# Patient Record
Sex: FEMALE | NOT HISPANIC OR LATINO | URBAN - METROPOLITAN AREA
[De-identification: names, ages, dates, MRNs, and addresses within clinical notes are randomized per-mention and may not be internally consistent; named-entity substitution may affect disease eponyms.]

---

## 2018-07-30 ENCOUNTER — IMPORTED ENCOUNTER (OUTPATIENT)
Dept: URBAN - METROPOLITAN AREA CLINIC 1 | Facility: CLINIC | Age: 43
End: 2018-07-30

## 2018-07-30 PROBLEM — H52.13: Noted: 2018-07-30

## 2018-07-30 PROCEDURE — S0620 ROUTINE OPHTHALMOLOGICAL EXA: HCPCS

## 2018-07-30 NOTE — PATIENT DISCUSSION
1. Myopia OU -- Finalized Glasses MRx was given to patient today for correction if indicated and requested. 2. Dry Eyes OU -- Recommend the frequent use of OTC AT's BID-QID OU. Trial CTL given to patient today. Return for an appointment in 1 WK for a CC OU with Dr. Felisa Santiago.

## 2018-08-07 ENCOUNTER — IMPORTED ENCOUNTER (OUTPATIENT)
Dept: URBAN - METROPOLITAN AREA CLINIC 1 | Facility: CLINIC | Age: 43
End: 2018-08-07

## 2019-06-03 ENCOUNTER — IMPORTED ENCOUNTER (OUTPATIENT)
Dept: URBAN - METROPOLITAN AREA CLINIC 1 | Facility: CLINIC | Age: 44
End: 2019-06-03

## 2019-06-03 PROBLEM — H52.13: Noted: 2019-06-03

## 2019-06-03 PROCEDURE — S0621 ROUTINE OPHTHALMOLOGICAL EXA: HCPCS

## 2019-06-03 NOTE — PATIENT DISCUSSION
1. Myopia OU -- Finalized Glasses MRx was given to patient today for correction. 2. Dry Eyes OU -- Recommend continue the frequent use of OTC AT's BID-QID OU. Finalized CTL Rx today. Return for an appointment in 1 year for a 40/cc with Dr. Prachi Flores.

## 2020-10-05 ENCOUNTER — IMPORTED ENCOUNTER (OUTPATIENT)
Dept: URBAN - METROPOLITAN AREA CLINIC 1 | Facility: CLINIC | Age: 45
End: 2020-10-05

## 2020-10-05 PROBLEM — H52.4: Noted: 2020-10-05

## 2020-10-05 PROBLEM — H44.23: Noted: 2020-10-05

## 2020-10-05 PROBLEM — H52.223: Noted: 2020-10-05

## 2020-10-05 PROCEDURE — S0621 ROUTINE OPHTHALMOLOGICAL EXA: HCPCS

## 2020-10-05 NOTE — PATIENT DISCUSSION
1.  High Myopia w/ Astigmatism OU -- Rx was given for correction if indicated and requested. 2. Presbyopia 3. Dry Eyes OU -- Cont the recommended use of ATs BID OU routinely. CTL Trials given today (Sph to Biofinity toric Lens). Return for an appointment in 1 week cc with Dr. Patricia Blood.

## 2020-10-20 ENCOUNTER — IMPORTED ENCOUNTER (OUTPATIENT)
Dept: URBAN - METROPOLITAN AREA CLINIC 1 | Facility: CLINIC | Age: 45
End: 2020-10-20

## 2020-10-20 NOTE — PATIENT DISCUSSION
1. CC Today -- Changes made to CTLs today. No change to power just brand. Biofinity Toric to Acuvue Oasys Astigmatism. Return for an appointment in with Dr. Daisy Cancino.

## 2020-11-04 ENCOUNTER — IMPORTED ENCOUNTER (OUTPATIENT)
Dept: URBAN - METROPOLITAN AREA CLINIC 1 | Facility: CLINIC | Age: 45
End: 2020-11-04

## 2020-11-04 NOTE — PATIENT DISCUSSION
1. CC Today -- Finalized CTL Rx today and given to patient. Good comfort fit and vision. Return for an appointment in 1 year 40/cc with Dr. Gunner Herrera.

## 2021-09-09 ENCOUNTER — IMPORTED ENCOUNTER (OUTPATIENT)
Dept: URBAN - METROPOLITAN AREA CLINIC 1 | Facility: CLINIC | Age: 46
End: 2021-09-09

## 2021-09-09 PROBLEM — H10.45: Noted: 2021-09-09

## 2021-09-09 PROBLEM — H00.12: Noted: 2021-09-09

## 2021-09-09 PROCEDURE — 92012 INTRM OPH EXAM EST PATIENT: CPT

## 2021-09-09 NOTE — PATIENT DISCUSSION
1.  Chalazion RLL -- Resolving. Begin hot compresses TID x 5 minutes for 3 weeks. If without improvement discussed with patient possible Incision and Drainage procedure. Risks and benefits discussed with patient and patient states full understanding. 2.  Allergic Conjunctivitis OU -- Recommended OTC Extra Strength Pataday QD OU PRN itching (Handout given). 3.  Dry Eyes OU -- Cont ATs BID-TID OU routinely. 4.  CTL wear Return for an appointment in October 40/cc with Dr. Linette Ansari.

## 2021-10-16 NOTE — PATIENT DISCUSSION
Jairo Lange is a 31 year old female to urgent care for complaints of cough, bilat ear aches, sore throat x couple days. 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 Has not taken any tylenol    Last took excedrin for headache yesterday evening, no other OTC's taken.    No know exposure to anyone who has been positive for COVID, has not had her COVID vaccines    Medications and allergies reviewed    [facility] clinic     Presbyopia: Rx was given for corrective spectacles if indicated.

## 2022-01-26 ENCOUNTER — IMPORTED ENCOUNTER (OUTPATIENT)
Dept: URBAN - METROPOLITAN AREA CLINIC 1 | Facility: CLINIC | Age: 47
End: 2022-01-26

## 2022-01-26 PROBLEM — H44.23: Noted: 2022-01-26

## 2022-01-26 PROBLEM — H52.223: Noted: 2022-01-26

## 2022-01-26 PROCEDURE — S0621 ROUTINE OPHTHALMOLOGICAL EXA: HCPCS

## 2022-01-26 NOTE — PATIENT DISCUSSION
1. Myopia w/ Astigmatism OU -- Rx was given for correction if indicated and requested. 2. Dry Eyes OU -- Cont ATs BID-TID OU routinely. 3.  Allergic Conjunctivitis OU -- Recommended OTC Extra Strength Pataday QD OU PRN itching (Handout given4. Chalazion RLL -- Resolved. CTL Rx finalized and given to pt. Return for an appointment in 1 year 40/cc with Dr. Lilian Navarro.

## 2022-04-02 ASSESSMENT — VISUAL ACUITY
OS_SC: 20/20-2
OD_SC: 20/25
OD_SC: 20/20
OS_CC: J1+
OS_SC: 20/20
OU_SC: 20/20-1
OD_SC: 20/20-1
OS_CC: J2
OD_CC: J2
OS_SC: 20/20
OS_SC: 20/25
OD_CC: J1+
OS_SC: 20/25
OS_CC: J1+
OS_SC: 20/20
OD_SC: 20/20
OD_CC: J1+
OS_SC: 20/20
OS_SC: 20/20
OU_CC: J2
OD_SC: 20/20
OD_SC: 20/25
OD_SC: 20/20
OD_SC: 20/20

## 2022-04-02 ASSESSMENT — TONOMETRY
OS_IOP_MMHG: 14
OS_IOP_MMHG: 15
OD_IOP_MMHG: 14
OD_IOP_MMHG: 14
OD_IOP_MMHG: 15
OS_IOP_MMHG: 14
OD_IOP_MMHG: 15
OS_IOP_MMHG: 14
OS_IOP_MMHG: 15
OD_IOP_MMHG: 15